# Patient Record
Sex: FEMALE
[De-identification: names, ages, dates, MRNs, and addresses within clinical notes are randomized per-mention and may not be internally consistent; named-entity substitution may affect disease eponyms.]

---

## 2024-09-12 PROBLEM — Z00.00 ENCOUNTER FOR PREVENTIVE HEALTH EXAMINATION: Status: ACTIVE | Noted: 2024-09-12

## 2024-09-16 ENCOUNTER — NON-APPOINTMENT (OUTPATIENT)
Age: 69
End: 2024-09-16

## 2024-09-16 ENCOUNTER — APPOINTMENT (OUTPATIENT)
Dept: COLORECTAL SURGERY | Facility: CLINIC | Age: 69
End: 2024-09-16
Payer: MEDICARE

## 2024-09-16 VITALS
TEMPERATURE: 97.8 F | SYSTOLIC BLOOD PRESSURE: 172 MMHG | WEIGHT: 155 LBS | BODY MASS INDEX: 25.83 KG/M2 | DIASTOLIC BLOOD PRESSURE: 98 MMHG | HEIGHT: 65 IN | HEART RATE: 77 BPM

## 2024-09-16 DIAGNOSIS — K60.2 ANAL FISSURE, UNSPECIFIED: ICD-10-CM

## 2024-09-16 DIAGNOSIS — Z78.9 OTHER SPECIFIED HEALTH STATUS: ICD-10-CM

## 2024-09-16 PROCEDURE — 46600 DIAGNOSTIC ANOSCOPY SPX: CPT

## 2024-09-16 PROCEDURE — 99203 OFFICE O/P NEW LOW 30 MIN: CPT | Mod: 25

## 2024-09-16 NOTE — HISTORY OF PRESENT ILLNESS
[FreeTextEntry1] : 70 y/o F presents for initial visit  Reason for visit: Hemorrhoids Referred by: Dr. Camacho   PMH: Denies PSH: Cholecystectomy FH: Denies CRC/IBD Meds: Denies  Allergies: NKDA  Last Colonoscopy: 2 years ago as per patient, denies any polyps removed at this recent one.   Pregnancy hx: DENIES  Patient presents for intermittent bleeding and anal irritation (burning) for more than >1 month. Symptoms noted w/ harder BM. Reports at times bleeding on TP and bowl.  Patient saw PCP who RX ed her suppositories for 1 week. Patient states she tried them but continued experiencing bleeding, irritation and itching.   Last time she saw blood was 3-4 days ago.   Moves bowels daily, denies use of stool softeners or fiber supplements.  Denies sitting on bowl for prolonged time. Reports occasional straining.   Denies ASA/NSAIDS in the last 7 days.

## 2024-09-16 NOTE — ASSESSMENT
[FreeTextEntry1] : I had an extensive discussion with the patient (30 minutes) regarding the diagnosis of an anal fissure. 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 The medical and surgical management options have been reviewed in detail including lubricant suppository therapy, stool softeners, fiber agents, and surgical anal dilatation. All questions have been reviewed and answered. Appropriate literature has been shared with the patient.

## 2024-09-16 NOTE — PHYSICAL EXAM
[Excoriation] : no perianal excoriation [Skin Tags] : residual hemorrhoidal skin tags were noted [Normal] : was normal [Anterior] : anteriorly [None] : there was no rectal mass  [de-identified] : Minimal external tag [FreeTextEntry1] : Medical assistant was present for the entire exam.  Anoscopy was performed for evaluation of the patients rectal bleeding  history . The risks, benefits and alternatives were reviewed.  A lighted anoscope was passed into the anal canal and the entire anal mucosal surface was inspected..   The findings revealed minimal internal hemorrhoids. No masses or lesions were identified.